# Patient Record
(demographics unavailable — no encounter records)

---

## 2020-07-06 NOTE — EKG
[place] [address]
Phone:  [phone]                     ELECTROCARDIOGRAM REPORT      
_______________________________________________________________________________
 
Name:         HARINI EASLEY                  Room:                     [facility]#:    [id number]     Account #:     [id number]  
Admission:    20    Attend Phys:                     
Discharge:    20    Date of Birth: 69  
Date of Service: 20 1638  Report #:      [id number]
        [id number]
_______________________________________________________________________________
THIS REPORT FOR:  //name//                      
 
                         [facility] ED
                                       
Test Date:    2020               Test Time:    16:38:19
Pat Name:     HARINI EASLEY               Department:   
Patient ID:   [id number]            Room:          
Gender:       F                        Technician:   
:          1969               Requested By: Genevieve Weiss
Order Number: [id number] MD:   Lizandro Olivas
                                 Measurements
Intervals                              Axis          
Rate:         88                       P:            26
MN:           162                      QRS:          23
QRSD:         106                      T:            42
QT:           369                                    
QTc:          447                                    
                           Interpretive Statements
Sinus rhythm
Low voltage, precordial leads
Abnormal inferior Q waves
No previous ECG available for comparison
Electronically Signed On 2020 15:33:34 CDT by Lizandro Olivas
[email or web address]
 
 
 
 
 
 
 
 
 
 
 
 
 
 
 
 
 
 
 
 
  <ELECTRONICALLY SIGNED>
                                           By: Lizandro Olivas MD, [facility]     
  20     1533
D: 20 1638   _____________________________________
T: 20 1638   Lizandro Olivas MD, [facility]       /EPI

## 2020-07-07 NOTE — EKG
[place] [address]
Phone:  [phone]                     ELECTROCARDIOGRAM REPORT      
_______________________________________________________________________________
 
Name:         HARINI EASLEY                    Room:                     [facility]#:    [id number]     Account #:     [id number]  
Admission:    20    Attend Phys:                     
Discharge:    20    Date of Birth: 69  
Date of Service: 20  Report #:      [id number]
        [id number]
_______________________________________________________________________________
THIS REPORT FOR:  //name//                      
 
                         [facility] ED
                                       
Test Date:    2020               Test Time:    21:01:26
Pat Name:     HARINI EASLEY               Department:   
Patient ID:   [id number]            Room:          
Gender:                               Technician:   
:          1969               Requested By: Milton Jo
Order Number: [id number] MD:   Matias Cruz
                                 Measurements
Intervals                              Axis          
Rate:         87                       P:            
MS:                                    QRS:          21
QRSD:         131                      T:            32
QT:           393                                    
QTc:          473                                    
                           Interpretive Statements
sinus rhythm
Nonspecific intraventricular conduction delay
Abnormal inferior Q waves
 
Artifact in lead(s) I,II,aVR,aVL,aVF,V1,V4,V6 and baseline wander in lead(s)
V1
Compared to ECG 2020 16:38
no change
Electronically Signed On 2020 9:55:01 CDT by Matias Cruz
[email or web address]
 
 
 
 
 
 
 
 
 
 
 
 
 
 
 
 
  <ELECTRONICALLY SIGNED>
                                           By: Mtaias Cruz MD, FAC      
  20     0955
D: 20   _____________________________________
T: 20   Matias Cruz MD, [facility]        /EPI

## 2020-11-03 NOTE — NUR
UPON ENTERING PT'S ROOM TO GIVE MORNING MEDICATIONS, PT REPORTS THAT SHE NEEDS
PAIN MEDICATIONS. PT INFORMED THAT SHE HAS TYLENOL AND LIDOCAINE PATCH ORDERED.
PT REPORTS THAT THESE MEDICATIONS DO NOT WORK FOR HER. PT REPORTS THAT THE
PROVIDER TOLD HER THAT SHE WOULD BE GIVEN MEDICATIONS AND THAT SHE TAKES
OXYCODONE AT HOME. PT WAS REMINDED THAT NO OPIOID MEDICATIONS WOULD BE ORDERED
R/T AMS AND HAVING TO HAVE NARCAN ADMINISTERED PER MD NOTES
. PT REPORTS THAT BLOOD GLUCOSE
WAS THE PROBLEM AND NOT HER MEDICATIONS. PT INFORMED THAT BLOOD GLUCOSE WAS
NORMAL UPON ADMISSION TO UNIT. PT REPORTS SHE WOULD LIKE TO SPEAK TO
PHYSICIAN. RN COMPLETED MORNING MEDICATIONS AND INFORMED PHYSICIAN OF
CONVERSATION WITH PT. PHYSICIAN REPORTS THAT HE WILL COME TALK WITH PT AGAIN
ABOUT NOT ADMINISTERING OPIOIDS WITHOUT TRYING OTHER METHODS FIRST.

## 2020-11-03 NOTE — EKG
[place] [address]
Phone:  [phone]                     ELECTROCARDIOGRAM REPORT      
_______________________________________________________________________________
 
Name:         TRACEEHARINI                    Room:          [place] [address]   ADM IN 
[facility].#:    [id number]     Account #:     [id number]  
Admission:    20    Attend Phys:   Daniel Putnam
Discharge:                Date of Birth: 69  
Date of Service: 20 2315  Report #:      [id number]
        [id number]
_______________________________________________________________________________
THIS REPORT FOR:  //name//                      
 
                         [facility] ED
                                       
Test Date:    2020               Test Time:    23:15:03
Pat Name:     HARINI EASLEY               Department:   
Patient ID:   [id number]            Room:         [facility]
Gender:       F                        Technician:   [place]
:          1969               Requested By: Sudheer Deras
Order Number: [id number] MD:   Cipriano Leyva
                                 Measurements
Intervals                              Axis          
Rate:         102                      P:            40
ID:           147                      QRS:          24
QRSD:         80                       T:            163
QT:           396                                    
QTc:          516                                    
                           Interpretive Statements
Sinus tachycardia
Low voltage, precordial leads
Consider inferior infarct
Nonspecific T abnormalities, lateral leads
Prolonged QT interval
Compared to ECG 2020 21:01:26
Myocardial infarct finding now present
T-wave abnormality now present
Prolonged QT interval now present
Electronically Signed On 11-3-2020 9:38:20 CST by Cipriano Leyva
[email or web address]
 
 
 
 
 
 
 
 
 
 
 
 
 
 
 
  <ELECTRONICALLY SIGNED>
                                           By: Cipriano Leyva MD, FACC   
  20     0938
D: 20 2315   _____________________________________
T: 20 2315   Cipriano Leyva MD, FAC     /EPI

## 2020-11-04 NOTE — NUR
PT IS ALERT AND ORIENTED BUT FORGETFUL THOUGHT SHE  TWICE HERE AND GOT CPR
TO COME BACK BUT ONLY NARCAN GIVEN TO HELP WAKE HER UP SHE IS STILL PRETTY
DROWSY ALL MEDS PRN TO GET PT MORE ALERT AND GET UP MOVING AROUND 4L NC O2
WHICH IS WHAT SHE WEARS AT HOME BIPAP IN ROOM NOT SURE IF SHE WORE IT AT ALL
LAST HS PT IS UP SBA TO AD BRIANNE WHEN MORE ALERT CALL LIGHT IN REACH WILL CONT
TO MONITOR AND WAKE PT UP MORE

## 2020-11-04 NOTE — NUR
PT ORIENTED BUT FORGETFUL AT TIMES. UP AD BRIANNE.  O2 AT 4 LITERS NC (ALSO HOME
DOSE O2) BIPAP HS. ON CONTINUOUS PULSE OX. O2 SAT IN THE MID TO UPPER 90S.
WILL CONTINUE TO MONITOR,

## 2020-11-04 NOTE — NUR
REPORT RECIEVED FROM MONET MOLINA. I CONCUR WITH HER DOCUMENTATION. PT MOVED TO
ROOM 305. FREQUENTLY USED ITEMS WITHIN REACH. FALL PRECAUTIONS IN PLACE. PT ON
4L NC AT THIS TIME.

## 2020-11-04 NOTE — NUR
Pt is A&O. Resides at home with her boyfriend and son. Son is Pt's caregiver
through her [employer], for 4hrs/day, 7 days per week. Per Pt, she is mostly
independent. Pt uses a cane or walker for mobility, primarily uses cane in
house and walker in the community. Pt wears home o2 at 4L continuous and also
has a trilogy. Pt states that she's not compliant with her trilogy, stating
that its currently in her storage unit, son is suppose to be going to get it.
Pt is current with [facility] and wants to resume at dc. NO hx of SNF.
Anticipate dc tomorrow, goal is to get Pt up and moving today. CM to fax HH
resumption orders at [place].

## 2020-11-05 NOTE — NUR
SPOKE WITH MIRIAN/[facility] [phone]. SHE SAID TEAM IS
RECOMMENDING SNF FOR PT. SHE HAS BEEN IN HOSPITAL FREQUENTLY (SHE STATED 2
TIMES) IN 30 DAY PERIOD. EXPLAINED TO HER THE DID NOT FEEL SNF WOULD BE
BEST DISCHARGE PLAN FOR HER AND HE WANTED HER TO GO HOME WITH HH. SHE SPOKE
WITH HER BOSS AND SAID THEY COULD ACCEPT HER BACK ON SERVICE AGAIN AT
DISCHARGE. TOLD HER IT WOULD MOST LIKELY BE TOMORROW. SHE SAID THAT WOULD BE
FINE.

## 2020-11-05 NOTE — NUR
PATIENT UP AND SHOWERED WITH ASSISTANCE. UP WITH WALKER AND 02. DR. WOODRUFF
NOTIFIED OF CXR RESULTS, ORDERS FOR ONE TIME IV LASIX TO BE GIVEN. INSULIN
GIVEN WITH MEALS. OXY IR GIVEN X 1 THIS SHIFT. POSSIBLE DISCHARGE TOMORROW.

## 2020-11-05 NOTE — NUR
PATIENT HAS RESTED WELL THROUGHOUT THE NIGHT. VSS ON 4L 02 VIA NASAL CANNULA.
PATIENT UP WITH ASSIST X 1 TO THE BATHROOM. MEDICATIONS GIVEN AS ORDERED AND
CHARTED. PATIENT ON TRILOGY AT HS. PATIENT ALERT AND ORIENTED X 3-4 AND
ANSWERS APPROPRIATELY. IV IN RIGHT UPPER ARM-SL. FALL PRECAUTIONS IN PLACE AND
HOURLY ROUNDS MADE. WILL CONTINUE WITH PLAN OF CARE AND NURSING TO MONITOR.

## 2020-11-06 NOTE — NUR
DISCHARGE PAPERWORK GIVEN TO PT WITH EXPLAIANTION. PT VERBALIZED
UNDERSTANDING. PRESCRIPTIONS SENT TO PT PHARMACY BY DR WOODRUFF. PT
NOTIFIED. IV ACCESS REMOVED. HOME O2 TANK DELIVERED FOR RIDE HOME. PT TO GO
HOME BY [place] WITH VOUCHER FROM [facility] .

## 2020-11-06 NOTE — NUR
INITIALLY THE PT THOUGHT SHE WOULD NEED CAB VOUCHER AND REQUESTED ONE FROM HER
BEDSIDE NURSE. CM ASSESSED AND PT STATED IF SHE IS D/C BEFORE NOON SHE WOULD
NOT NEED A CAB. RN COULD NOT GUARANTEE NOON D/C SO CM SEND ORDER TO [facility] TO
SEE IF TANK COULD BE DELIVERED FOR TRASNPORT TO HOME. HOWEVER, PT CHANGED
HER MIND SINCE HER SON DOES NOT HAVE TO WORK TODAY HE CAN  AND BRING
TANK FROM HOME SO SHE NO LONGER NEEDS CAB VOUCHER OR TANK.

## 2020-11-06 NOTE — NUR
Alert and oriented x 4. She has O2 at 4Ln/c and she is stand by assist to the
bathroom. Lungs are clear and very diminished in the lower lobes,I.S. was
encouraged. vitals stable,she slept well.

## 2020-11-06 NOTE — NUR
PT.SAID SON GOT CALLED TO WORK. CAB VOUCHER GIVEN AND CM CALLED FOR RIDE FOR
PT. CALLED [facility]. SHE DELIVERED PORTABLE TANK TO HOSPITAL FOR PT.